# Patient Record
Sex: MALE | URBAN - METROPOLITAN AREA
[De-identification: names, ages, dates, MRNs, and addresses within clinical notes are randomized per-mention and may not be internally consistent; named-entity substitution may affect disease eponyms.]

---

## 2019-02-22 ENCOUNTER — HOSPITAL ENCOUNTER (EMERGENCY)
Facility: CLINIC | Age: 64
Discharge: HOME | End: 2019-02-22
Payer: COMMERCIAL

## 2019-02-22 VITALS
HEART RATE: 61 BPM | OXYGEN SATURATION: 98 % | WEIGHT: 200 LBS | TEMPERATURE: 97 F | RESPIRATION RATE: 14 BRPM | DIASTOLIC BLOOD PRESSURE: 91 MMHG | BODY MASS INDEX: 33.32 KG/M2 | HEIGHT: 65 IN | SYSTOLIC BLOOD PRESSURE: 150 MMHG

## 2019-02-22 SDOH — HEALTH STABILITY: MENTAL HEALTH: HOW OFTEN DO YOU HAVE A DRINK CONTAINING ALCOHOL?: NEVER

## 2019-02-22 ASSESSMENT — MIFFLIN-ST. JEOR: SCORE: 1629.07

## 2019-02-23 NOTE — ED TRIAGE NOTES
Pt states that he has had abd pain for 2 days it worsens when he takes a deep breath or eats, denies nausea or vomiting